# Patient Record
(demographics unavailable — no encounter records)

---

## 2025-05-02 NOTE — PHYSICAL EXAM
[5___] : plantar flexion 5[unfilled]/5 [2+] : posterior tibialis pulse: 2+ [] : patient ambulates without assistive device [Left] : left foot [There are no fractures, subluxations or dislocations. No significant abnormalities are seen] : There are no fractures, subluxations or dislocations. 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

## 2025-05-02 NOTE — HISTORY OF PRESENT ILLNESS
[de-identified] : Patient presents for LT foot and ankle pain evaluation. Patient states that about 3 weeks she has been having medial ankle pain that will radiate down to her heel and the top if her foot and her toes. Patient did a lot of walking on a [place] trip during [place] break and seemed to exacerbate her pain. Patient has been having to sit out from gym some times due to the pain.

## 2025-05-02 NOTE — DISCUSSION/SUMMARY
[de-identified] :  I reviewed patient's radiographs and discussed her treatment course with patient and her parent.  Defer further imaging or formal course of physical therapy.  I advised obtaining cushioned gel heel cups in all shoewear.  May resume activities as tolerated.  Recommended Achilles tendon stretching and applying ice as needed.  Follow up in 6 weeks or as needed. Patient and her mother voiced understanding and agreement with the plan.